# Patient Record
Sex: FEMALE | Race: BLACK OR AFRICAN AMERICAN | NOT HISPANIC OR LATINO | Employment: PART TIME | URBAN - METROPOLITAN AREA
[De-identification: names, ages, dates, MRNs, and addresses within clinical notes are randomized per-mention and may not be internally consistent; named-entity substitution may affect disease eponyms.]

---
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
## 2023-11-17 ENCOUNTER — ANCILLARY PROCEDURE (OUTPATIENT)
Dept: GENERAL RADIOLOGY | Facility: CLINIC | Age: 18
End: 2023-11-17
Attending: FAMILY MEDICINE
Payer: COMMERCIAL

## 2023-11-17 ENCOUNTER — OFFICE VISIT (OUTPATIENT)
Dept: FAMILY MEDICINE | Facility: CLINIC | Age: 18
End: 2023-11-17
Payer: COMMERCIAL

## 2023-11-17 VITALS
DIASTOLIC BLOOD PRESSURE: 82 MMHG | OXYGEN SATURATION: 98 % | SYSTOLIC BLOOD PRESSURE: 131 MMHG | HEART RATE: 95 BPM | TEMPERATURE: 97.6 F | WEIGHT: 293 LBS | RESPIRATION RATE: 16 BRPM

## 2023-11-17 DIAGNOSIS — R05.1 ACUTE COUGH: ICD-10-CM

## 2023-11-17 DIAGNOSIS — R05.1 ACUTE COUGH: Primary | ICD-10-CM

## 2023-11-17 PROCEDURE — 99214 OFFICE O/P EST MOD 30 MIN: CPT | Performed by: FAMILY MEDICINE

## 2023-11-17 PROCEDURE — 71046 X-RAY EXAM CHEST 2 VIEWS: CPT | Mod: TC | Performed by: RADIOLOGY

## 2023-11-17 RX ORDER — BENZONATATE 100 MG/1
100-200 CAPSULE ORAL 3 TIMES DAILY PRN
Qty: 40 CAPSULE | Refills: 0 | Status: SHIPPED | OUTPATIENT
Start: 2023-11-17

## 2023-11-17 RX ORDER — ALBUTEROL SULFATE 90 UG/1
2 AEROSOL, METERED RESPIRATORY (INHALATION) EVERY 6 HOURS PRN
Qty: 18 G | Refills: 0 | Status: SHIPPED | OUTPATIENT
Start: 2023-11-17

## 2023-11-17 RX ORDER — CEFDINIR 300 MG/1
300 CAPSULE ORAL 2 TIMES DAILY
Qty: 14 CAPSULE | Refills: 0 | Status: SHIPPED | OUTPATIENT
Start: 2023-11-17 | End: 2023-11-24

## 2023-11-17 RX ORDER — AZITHROMYCIN 250 MG/1
TABLET, FILM COATED ORAL
Qty: 6 TABLET | Refills: 0 | Status: SHIPPED | OUTPATIENT
Start: 2023-11-17 | End: 2023-11-22

## 2023-11-17 RX ORDER — DEXTROAMPHETAMINE SACCHARATE, AMPHETAMINE ASPARTATE MONOHYDRATE, DEXTROAMPHETAMINE SULFATE AND AMPHETAMINE SULFATE 6.25; 6.25; 6.25; 6.25 MG/1; MG/1; MG/1; MG/1
25 CAPSULE, EXTENDED RELEASE ORAL
COMMUNITY
Start: 2023-07-28 | End: 2023-12-09

## 2023-11-17 NOTE — PROGRESS NOTES
(R05.1) Acute cough  (primary encounter diagnosis)  Comment: possible pneumonia. Will treat as such with omnicef and zithromax along with albuteorl and tessalon.   Plan: XR Chest 2 Views, cefdinir (OMNICEF) 300 MG         capsule, azithromycin (ZITHROMAX) 250 MG         tablet, albuterol (PROAIR HFA/PROVENTIL         HFA/VENTOLIN HFA) 108 (90 Base) MCG/ACT         inhaler, benzonatate (TESSALON) 100 MG capsule           -------------------------------  Eric Bailey with presents with 10 days symptoms including productive cough worsneing with some mild sore throat and congestion. Tested neg for covid. There is not some mild shortness of breath and some gurgling on the left side. No pleuritic pain. No leg swelling. .    Treatment measures tried include Tylenol/Ibuprofen and OTC Cough med.  Exposures--nieces with coughs/uris  Recent travel--no    Current Outpatient Medications   Medication Sig Dispense Refill    amphetamine-dextroamphetamine (ADDERALL XR) 25 MG 24 hr capsule Take 25 mg by mouth         ROS otherwise negative for resp., ID,  HEENT symptoms.    Objective: /82   Pulse 95   Temp 97.6  F (36.4  C) (Oral)   Resp 16   Wt (!) 162.3 kg (357 lb 14.4 oz)   SpO2 98%   Exam:  GENERAL APPEARANCE: healthy, alert and no distress  EYES: Eyes grossly normal to inspection  HENT: ear canals and TM's normal and nose and mouth without ulcers or lesions  NECK: no adenopathy, no asymmetry, masses, or scars and thyroid normal to palpation  RESP: lungs with a few wheezes and crackles left base.     Cxr Per my interpretation. Possible infiltrate left base.

## 2023-11-17 NOTE — LETTER
November 17, 2023      Eric Bailey  [address] [id number]        To Whom It May Concern:    Eric Bailey was seen in our clinic. She may return to work without restrictions on or about November 18, 2023. Please excuse her absence today.       Sincerely,        Dhaval Lagos MD